# Patient Record
Sex: FEMALE | Race: WHITE
[De-identification: names, ages, dates, MRNs, and addresses within clinical notes are randomized per-mention and may not be internally consistent; named-entity substitution may affect disease eponyms.]

---

## 2019-11-21 ENCOUNTER — HOSPITAL ENCOUNTER (OUTPATIENT)
Dept: HOSPITAL 74 - JASU-SURG | Age: 39
Discharge: HOME | End: 2019-11-21
Attending: OBSTETRICS & GYNECOLOGY
Payer: COMMERCIAL

## 2019-11-21 VITALS — BODY MASS INDEX: 19.4 KG/M2

## 2019-11-21 VITALS — HEART RATE: 74 BPM | SYSTOLIC BLOOD PRESSURE: 110 MMHG | DIASTOLIC BLOOD PRESSURE: 50 MMHG

## 2019-11-21 VITALS — TEMPERATURE: 98.1 F

## 2019-11-21 DIAGNOSIS — D25.0: Primary | ICD-10-CM

## 2019-11-21 PROCEDURE — 0UB98ZZ EXCISION OF UTERUS, VIA NATURAL OR ARTIFICIAL OPENING ENDOSCOPIC: ICD-10-PCS | Performed by: OBSTETRICS & GYNECOLOGY

## 2019-11-21 NOTE — OP
DATE OF OPERATION:  11/21/2019

 

PREOPERATIVE DIAGNOSIS:  Endometrial polyp.

 

OPERATION:  Hysteroscopic myomectomy, suction dilation and curettage.

 

POSTOPERATIVE DIAGNOSIS:  Submucosal myoma.

 

SURGEON:  Dyan Song MD

 

ANESTHESIA:  General.

 

ESTIMATED BLOOD LOSS:  40 mL.

 

PROCEDURE:  The patient was taken to the operating room, placed in dorsal lithotomy

position, prepped and draped in the usual sterile fashion.  A timeout was performed

in accordance with hospital regulation.  Speculum was placed in vagina, anterior lip

of the cervix grasped with single-tooth tenaculum.  Cervix was then dilated.  Some

stenosis was noted in the cervix and _____ was then used to open the cervix and then

dilate it up to the level of the operative hysteroscope.  Operative hysteroscope was

inserted and visualization revealed a submucosal myoma.  It was then removed, and a

cervical polyp was noted and was removed.  Suction D&C was then done.  Specimen was

submitted to Pathology.  Hemostasis achieved.  All instruments were then removed. 

There was a tear in the cervix, so 2-0 Vicryl was then used to reapproximate the

cervix.  Estimated blood loss was around 40 mL.  Patient tolerated procedure well,

was taken to recovery room in stable condition.

 

 

 

DYAN SONG M.D.

 

[id number]

DD: 11/21/2019 12:55

DT: 11/21/2019 14:31

Job #:  [id number]

## 2019-11-21 NOTE — OP
Operative Note





- Note:


Operative Date: 11/21/19


Post-Operative Diagnosis: Same as Pre-op


Anesthesia: General

## 2019-11-22 NOTE — PATH
Surgical Pathology Report



Patient Name:  ISAIAH MOROCHO

Accession #:  [id number]

[facility]. Rec. #:  [id number]                                                        

   /Age/Gender:  1980 (Age: 39) / F

Account:  [id number]                                                          

             Location: [facility] SURGICAL

Taken:  2019

Received:  2019

Reported:  2019

Physicians:  Dyan Song M.D.

  



Specimen(s) Received

 CONTENTS OF UTERUS 





Clinical History

Endometrial polyp

Postoperative diagnosis: Endometrial fibroid







Final Diagnosis

CONTENTS OF UTERUS, DILATION AND CURETTAGE:

POLYPOID FRAGMENTS OF SECRETORY ENDOMETRIUM IN A BACKGROUND OF ABUNDANT BLOOD.





***Electronically Signed***

Ashlee Gifford M.D.





Gross Description

Received in formalin labeled "contents of uterus," is a 3.5 x 2.5 x 0.3 cm

aggregate of red-brown soft tissue fragments. The formalin is filtered and the

specimen is entirely submitted in 2 cassettes.

DL/2019



saudi/[email or web address]

## 2021-11-11 ENCOUNTER — HOSPITAL ENCOUNTER (OUTPATIENT)
Dept: HOSPITAL 74 - JASU-ENDO | Age: 41
Discharge: HOME | End: 2021-11-11
Attending: INTERNAL MEDICINE
Payer: COMMERCIAL

## 2021-11-11 VITALS — DIASTOLIC BLOOD PRESSURE: 71 MMHG | SYSTOLIC BLOOD PRESSURE: 110 MMHG | HEART RATE: 79 BPM

## 2021-11-11 VITALS — BODY MASS INDEX: 20.5 KG/M2

## 2021-11-11 VITALS — TEMPERATURE: 97.8 F

## 2021-11-11 DIAGNOSIS — K29.00: ICD-10-CM

## 2021-11-11 DIAGNOSIS — D50.9: Primary | ICD-10-CM

## 2021-11-11 PROCEDURE — 0DB98ZX EXCISION OF DUODENUM, VIA NATURAL OR ARTIFICIAL OPENING ENDOSCOPIC, DIAGNOSTIC: ICD-10-PCS | Performed by: INTERNAL MEDICINE

## 2021-11-11 PROCEDURE — 0DB68ZX EXCISION OF STOMACH, VIA NATURAL OR ARTIFICIAL OPENING ENDOSCOPIC, DIAGNOSTIC: ICD-10-PCS | Performed by: INTERNAL MEDICINE
